# Patient Record
Sex: FEMALE | Race: WHITE | Employment: FULL TIME | URBAN - METROPOLITAN AREA
[De-identification: names, ages, dates, MRNs, and addresses within clinical notes are randomized per-mention and may not be internally consistent; named-entity substitution may affect disease eponyms.]

---

## 2018-09-17 ENCOUNTER — OFFICE VISIT (OUTPATIENT)
Dept: URGENT CARE | Age: 33
End: 2018-09-17

## 2018-09-17 VITALS
OXYGEN SATURATION: 99 % | SYSTOLIC BLOOD PRESSURE: 117 MMHG | DIASTOLIC BLOOD PRESSURE: 56 MMHG | WEIGHT: 115 LBS | BODY MASS INDEX: 20.38 KG/M2 | RESPIRATION RATE: 18 BRPM | HEART RATE: 67 BPM | HEIGHT: 63 IN | TEMPERATURE: 97.8 F

## 2018-09-17 DIAGNOSIS — L53.9 ACUTE ERYTHEMATOUS ERUPTION OF SKIN: Primary | ICD-10-CM

## 2018-09-17 RX ORDER — PREDNISONE 20 MG/1
20 TABLET ORAL 2 TIMES DAILY
Qty: 10 TAB | Refills: 0 | Status: SHIPPED | OUTPATIENT
Start: 2018-09-17 | End: 2018-09-22

## 2018-09-17 RX ORDER — MUPIROCIN 20 MG/G
OINTMENT TOPICAL DAILY
Qty: 22 G | Refills: 0 | Status: SHIPPED | OUTPATIENT
Start: 2018-09-17

## 2018-09-17 RX ORDER — NORETHINDRONE ACETATE AND ETHINYL ESTRADIOL 1; .02 MG/1; MG/1
TABLET ORAL
COMMUNITY

## 2018-09-17 NOTE — PATIENT INSTRUCTIONS

## 2018-09-17 NOTE — MR AVS SNAPSHOT
[address] [address] [place] [address] 
[phone] Patient: Ani Gonzalez MRN: [id number] Visit Information Date & Time Provider Department Dept. Phone Encounter #  
 9/17/2018  7:30 PM [facility] [facility] [phone] [id number] Upcoming Health Maintenance Date Due DTaP/Tdap/Td series (1 - Tdap) 9/21/2006 PAP AKA CERVICAL CYTOLOGY 9/21/2006 Influenza Age 5 to Adult 8/1/2018 Allergies as of 9/17/2018  Review Complete On: 9/17/2018 By: Rosaura Bobby RN No Known Allergies Current Immunizations  Never Reviewed No immunizations on file. Not reviewed this visit You Were Diagnosed With   
  
 Codes Comments Acute erythematous eruption of skin    -  Primary ICD-10-CM: L53.8 ICD-9-CM: 695.89 Rt side of neck Vitals BP Pulse Temp Resp Height(growth percentile) Weight(growth percentile) 117/56 67 97.8 °F (36.6 °C) 18 5' 3\" (1.6 m) 115 lb (52.2 kg) SpO2 BMI OB Status Smoking Status 99% 20.37 kg/m2 Having regular periods Never Smoker BMI and BSA Data Body Mass Index Body Surface Area  
 20.37 kg/m 2 1.52 m 2 Your Updated Medication List  
  
   
This list is accurate as of 9/17/18  7:57 PM.  Always use your most recent med list.  
  
  
  
  
 DYMISTA NA  
by Nasal route. LOESTRIN 1/20 (21) 1-20 mg-mcg Tab Generic drug:  norethindrone-ethinyl estradiol Take  by mouth.  
  
 mupirocin 2 % ointment Commonly known as:  [facility] Apply  to affected area daily. predniSONE 20 mg tablet Commonly known as:  Reino Singh Take 1 Tab by mouth two (2) times a day for 5 days. With food Prescriptions Printed Refills  
 mupirocin (BACTROBAN) 2 % ointment 0 Sig: Apply  to affected area daily. Class: Print  Route: Topical  
 predniSONE (DELTASONE) 20 mg tablet 0  
 Sig: Take 1 Tab by mouth two (2) times a day for 5 days. With food Class: Print Route: Oral  
  
Patient Instructions Rash: Care Instructions Your Care Instructions A rash is any irritation or inflammation of the skin. Rashes have many possible causes, including allergy, infection, illness, heat, and emotional stress. Follow-up care is a key part of your treatment and safety. Be sure to make and go to all appointments, and call your doctor if you are having problems. It's also a good idea to know your test results and keep a list of the medicines you take. How can you care for yourself at home? · Wash the area with water only. Soap can make dryness and itching worse. Pat dry. · Put cold, wet cloths on the rash to reduce itching. · Keep cool, and stay out of the sun. · Leave the rash open to the air as much of the time as possible. · Sometimes petroleum jelly (Vaseline) can help relieve the discomfort caused by a rash. A moisturizing lotion, such as Cetaphil, also may help. Calamine lotion may help for rashes caused by contact with something (such as a plant or soap) that irritated the skin. Use it 3 or 4 times a day. · If your doctor prescribed a cream, use it as directed. If your doctor prescribed medicine, take it exactly as directed. · If your rash itches so badly that it interferes with your normal activities, take an over-the-counter antihistamine, such as diphenhydramine (Benadryl) or loratadine (Claritin). Read and follow all instructions on the label. When should you call for help? Call your doctor now or seek immediate medical care if: 
  · You have signs of infection, such as: 
¨ Increased pain, swelling, warmth, or redness. ¨ Red streaks leading from the area. ¨ Pus draining from the area. ¨ A fever.  
  · You have joint pain along with the rash.  
 Watch closely for changes in your health, and be sure to contact your doctor if:   · Your rash is changing or getting worse. For example, call if you have pain along with the rash, the rash is spreading, or you have new blisters.  
  · You do not get better after 1 week. Where can you learn more? Go to http://malissa-saravanan.info/. Enter K032 in the search box to learn more about \"Rash: Care Instructions. \" Current as of: October 5, 2017 Content Version: 11.7 © 3198-3575 [employer]. Care instructions adapted under license by [employer] (which disclaims liability or warranty for this information). If you have questions about a medical condition or this instruction, always ask your healthcare professional. [address] any warranty or liability for your use of this information. Introducing [facility]! Harshal Conner introduces Qitio patient portal. Now you can access parts of your medical record, email your doctor's office, and request medication refills online. 1. In your internet browser, go to [email or web address] 2. Click on the First Time User? Click Here link in the Sign In box. You will see the New Member Sign Up page. 3. Enter your Qitio Access Code exactly as it appears below. You will not need to use this code after youve completed the sign-up process. If you do not sign up before the expiration date, you must request a new code. · Qitio Access Code: [email or web address] Expires: 12/16/2018  7:35 PM 
 
4. Enter the last four digits of your Social Security Number (xxxx) and Date of Birth (mm/dd/yyyy) as indicated and click Submit. You will be taken to the next sign-up page. 5. Create a Qitio ID. This will be your Qitio login ID and cannot be changed, so think of one that is secure and easy to remember. 6. Create a Qitio password. You can change your password at any time. 7. Enter your Password Reset Question and Answer.  This can be used at a later time if you forget your password. 8. Enter your e-mail address. You will receive e-mail notification when new information is available in [address] 9. Click Sign Up. You can now view and download portions of your medical record. 10. Click the Download Summary menu link to download a portable copy of your medical information. If you have questions, please visit the Frequently Asked Questions section of the SmartHabitat website. Remember, SmartHabitat is NOT to be used for urgent needs. For medical emergencies, dial 911. Now available from your iPhone and Android! Please provide this summary of care documentation to your next provider. Your primary care clinician is listed as Tami Farley. If you have any questions after today's visit, please call [phone].

## 2018-09-17 NOTE — PROGRESS NOTES
Patient is a 28 y.o. female presenting with skin problem. Skin Problem   This is a new problem. The current episode started more than 2 days ago. The problem has been gradually worsening. Associated symptoms comments: Small area of redness on rt side of neck- concern for shingles. Treatments tried: HC and bacitracin  The treatment provided no relief. Past Medical History:   Diagnosis Date    Gastroenteritis     2005        History reviewed. No pertinent surgical history. Family History   Problem Relation Age of Onset    Heart Disease Mother     Heart Disease Father         Social History     Social History    Marital status: SINGLE     Spouse name: N/A    Number of children: N/A    Years of education: N/A     Occupational History    Not on file. Social History Main Topics    Smoking status: Never Smoker    Smokeless tobacco: Never Used    Alcohol use Not on file    Drug use: Not on file    Sexual activity: Not on file     Other Topics Concern    Not on file     Social History Narrative                ALLERGIES: Review of patient's allergies indicates no known allergies. Review of Systems   All other systems reviewed and are negative. Vitals:    09/17/18 1938   BP: 117/56   Pulse: 67   Resp: 18   Temp: 97.8 °F (36.6 °C)   SpO2: 99%   Weight: 115 lb (52.2 kg)   Height: 5' 3\" (1.6 m)       Physical Exam   Skin: There is erythema (small papular lesion on rt side of neck ). Nursing note and vitals reviewed. MDM    Procedures      ICD-10-CM ICD-9-CM    1. Acute erythematous eruption of skin L53.8 695.89     Rt side of neck     Medications Ordered Today   Medications    mupirocin (BACTROBAN) 2 % ointment     Sig: Apply  to affected area daily. Dispense:  22 g     Refill:  0    predniSONE (DELTASONE) 20 mg tablet     Sig: Take 1 Tab by mouth two (2) times a day for 5 days. With food     Dispense:  10 Tab     Refill:  0     No results found for any visits on 09/17/18.   The patients condition was discussed with the patient and they understand. The patient is to follow up with primary care doctor. If signs and symptoms become worse the pt is to go to the ER. The patient is to take medications as prescribed.

## 2022-04-24 ENCOUNTER — OFFICE VISIT (OUTPATIENT)
Dept: URGENT CARE | Age: 37
End: 2022-04-24
Payer: COMMERCIAL

## 2022-04-24 VITALS
BODY MASS INDEX: 21.62 KG/M2 | RESPIRATION RATE: 16 BRPM | OXYGEN SATURATION: 100 % | WEIGHT: 122 LBS | SYSTOLIC BLOOD PRESSURE: 104 MMHG | HEIGHT: 63 IN | TEMPERATURE: 98 F | HEART RATE: 72 BPM | DIASTOLIC BLOOD PRESSURE: 70 MMHG

## 2022-04-24 DIAGNOSIS — M79.671 RIGHT FOOT PAIN: Primary | ICD-10-CM

## 2022-04-24 PROCEDURE — 99213 OFFICE O/P EST LOW 20 MIN: CPT | Performed by: INTERNAL MEDICINE

## 2022-04-24 RX ORDER — MELOXICAM 15 MG/1
15 TABLET ORAL DAILY
COMMUNITY
Start: 2021-09-17

## 2022-04-24 RX ORDER — METHYLPREDNISOLONE 4 MG/1
TABLET ORAL AS DIRECTED
COMMUNITY
Start: 2021-11-23

## 2022-04-24 RX ORDER — EPINEPHRINE 0.3 MG/.3ML
INJECTION SUBCUTANEOUS
COMMUNITY
Start: 2021-10-20

## 2022-04-24 RX ORDER — CETIRIZINE HYDROCHLORIDE 10 MG/1
CAPSULE, LIQUID FILLED ORAL DAILY
COMMUNITY

## 2022-04-24 RX ORDER — ESZOPICLONE 3 MG/1
3 TABLET, FILM COATED ORAL AT BEDTIME
COMMUNITY
Start: 2021-11-30

## 2022-04-24 RX ORDER — DEXTROAMPHETAMINE SACCHARATE, AMPHETAMINE ASPARTATE, DEXTROAMPHETAMINE SULFATE AND AMPHETAMINE SULFATE 2.5; 2.5; 2.5; 2.5 MG/1; MG/1; MG/1; MG/1
1 TABLET ORAL 3 TIMES DAILY
COMMUNITY
Start: 2022-03-23

## 2022-04-24 NOTE — PROGRESS NOTES
HISTORY OF PRESENT ILLNESS  Memory Tressa Todd is a 39 y.o. female. Patient presents with: Foot Pain: right foot, walked alot Friday without supportive shoes    No fall, misstep, or injury reported. However, does mention practicing yoga often with her foot being placed in different poses. Pain is primarily to plantar aspect but concerned for bruising to outer ankle noted this morning. Rested, iced, and took NSAID yesterday; but pain resumed upon waking BEFORE getting out of bed. Foot Pain  This is a new problem. The current episode started 2 days ago. The problem occurs constantly. The problem has not changed since onset. Pertinent negatives include no chest pain, no headaches and no shortness of breath. The symptoms are aggravated by walking. Nothing relieves the symptoms. She has tried a cold compress, acetaminophen and rest for the symptoms. The treatment provided mild relief. Patient Active Problem List   Diagnosis Code    Chest pain R07.9     Patient Active Problem List    Diagnosis Date Noted    Chest pain 01/03/2012     Current Outpatient Medications   Medication Sig Dispense Refill    methylPREDNISolone (MEDROL DOSEPACK) 4 mg tablet as directed.  dextroamphetamine-amphetamine (ADDERALL) 10 mg tablet Take 1 Tablet by mouth three (3) times daily.  eszopiclone (LUNESTA) 3 mg tablet Take 3 mg by mouth At bedtime.  Cetirizine (ZyrTEC) 10 mg cap daily.  azelastine/fluticasone (DYMISTA NA) by Nasal route.  mupirocin (BACTROBAN) 2 % ointment Apply  to affected area daily. 22 g 0    EPINEPHrine (EPIPEN) 0.3 mg/0.3 mL injection INJECT AS DIRECTED AS NEEDED FOR SEVERE ALLERGIC REACTION. CALL 911 AFTER USE      meloxicam (MOBIC) 15 mg tablet Take 15 mg by mouth daily.  norethindrone-ethinyl estradiol (LOESTRIN 1/20, 21,) 1-20 mg-mcg tab Take  by mouth.  (Patient not taking: Reported on 4/24/2022)       Allergies   Allergen Reactions    Sulfa (Sulfonamide Antibiotics) Other (comments)     GI upset - happen 30 years ago     Past Medical History:   Diagnosis Date    Gastroenteritis     2005     No past surgical history on file. Family History   Problem Relation Age of Onset    Heart Disease Mother     Heart Disease Father      Social History     Tobacco Use    Smoking status: Never Smoker    Smokeless tobacco: Never Used   Substance Use Topics    Alcohol use: Not on file          Review of Systems   Constitutional: Negative for chills, fever and malaise/fatigue. HENT: Negative for congestion and sore throat. Respiratory: Negative for cough and shortness of breath. Cardiovascular: Negative for chest pain. Gastrointestinal: Negative for diarrhea, nausea and vomiting. Musculoskeletal: Negative for falls and myalgias. Neurological: Negative for headaches. All other systems reviewed and are negative. Physical Exam  Vitals and nursing note reviewed. Constitutional:       General: She is not in acute distress. Appearance: She is well-developed. She is not diaphoretic. HENT:      Head: Normocephalic and atraumatic. Right Ear: External ear normal.      Left Ear: External ear normal.   Cardiovascular:      Rate and Rhythm: Normal rate. Pulmonary:      Effort: Pulmonary effort is normal. No respiratory distress. Abdominal:      General: There is no distension. Musculoskeletal:      Right foot: Normal range of motion. No deformity, bunion or foot drop. Feet:    Feet:      Right foot:      Skin integrity: Skin integrity normal.      Toenail Condition: Right toenails are normal.   Neurological:      Mental Status: She is alert and oriented to person, place, and time. Psychiatric:         Behavior: Behavior normal.         Judgment: Judgment normal.         ASSESSMENT and PLAN    ICD-10-CM ICD-9-CM    1.  Right foot pain  M79.671 729.5 XR FOOT RT MIN 3 V

## 2022-04-24 NOTE — PATIENT INSTRUCTIONS
Follow Up with PCP/ortho in 1-2 WEEKS if no improvement/routine care. Call to be seen sooner or return Here/ER, if no improvement with treatments advised/prescribed or symptoms/pain worsen (develop fever, pain worsens significantly, or develop NEW symptoms). Plantar Fasciitis: Care Instructions  Overview     Plantar fasciitis is pain and inflammation of the plantar fascia, the tissue at the bottom of your foot that connects the heel bone to the toes. The plantar fascia also supports the arch. If you strain the plantar fascia, it can develop small tears and cause heel pain when you stand or walk. Plantar fasciitis can be caused by running or other sports. It also may occur in people who are overweight or who have high arches or flat feet. You may get plantar fasciitis if you walk or stand for long periods, or have a tight Achilles tendon or calf muscles. You can improve your foot pain with rest and other care at home. It might take a few weeks to a few months for your foot to heal completely. Follow-up care is a key part of your treatment and safety. Be sure to make and go to all appointments, and call your doctor if you are having problems. It's also a good idea to know your test results and keep a list of the medicines you take. How can you care for yourself at home? · Rest your feet often. Reduce your activity to a level that lets you avoid pain. If possible, do not run or walk on hard surfaces. · Take pain medicines exactly as directed. ? If the doctor gave you a prescription medicine for pain, take it as prescribed. ? If you are not taking a prescription pain medicine, take an over-the-counter anti-inflammatory medicine for pain and swelling, such as ibuprofen (Advil, Motrin) or naproxen (Aleve). Read and follow all instructions on the label. · Use ice massage to help with pain and swelling. You can use an ice cube or an ice cup several times a day.  To make an ice cup, fill a paper cup with water and freeze it. Cut off the top of the cup until a half-inch of ice shows. Hold onto the remaining paper to use the cup. Rub the ice in small circles over the area for 5 to 7 minutes. · Contrast baths, which alternate hot and cold water, can also help reduce swelling. But because heat alone may make pain and swelling worse, end a contrast bath with a soak in cold water. · Wear a night splint if your doctor suggests it. A night splint holds your foot with the toes pointed up and the foot and ankle at a 90-degree angle. This position gives the bottom of your foot a constant, gentle stretch. · Do simple exercises such as calf stretches and towel stretches 2 to 3 times each day, especially when you first get up in the morning. These can help the plantar fascia become more flexible. They also make the muscles that support your arch stronger. Hold these stretches for 15 to 30 seconds per stretch. Repeat 2 to 4 times. ? Stand about 1 foot from a wall. Place the palms of both hands against the wall at chest level. Lean forward against the wall, keeping one leg with the knee straight and heel on the ground while bending the knee of the other leg.  ? Sit down on the floor or a mat with your feet stretched in front of you. Roll up a towel lengthwise, and loop it over the ball of your foot. Holding the towel at both ends, gently pull the towel toward you to stretch your foot. · Wear shoes with good arch support. Athletic shoes or shoes with a well-cushioned sole are good choices. · Replace athletic shoes regularly. · Try heel cups or shoe inserts (orthotics) to help cushion your heel. You can buy these at many shoe stores. · Put on your shoes as soon as you get out of bed. Going barefoot or wearing slippers may make your pain worse. · Reach and stay at a good weight for your height. This puts less strain on your feet. When should you call for help?    Call your doctor now or seek immediate medical care if:    · You have heel pain with fever, redness, or warmth in your heel.     · You cannot put weight on the sore foot. Watch closely for changes in your health, and be sure to contact your doctor if:    · You have numbness or tingling in your heel.     · Your heel pain lasts more than 2 weeks. Where can you learn more? Go to http://www.gray.com/  Enter X351 in the search box to learn more about \"Plantar Fasciitis: Care Instructions. \"  Current as of: July 1, 2021               Content Version: 13.2  © 2803-5335 [employer]. Care instructions adapted under license by User [employer]play (which disclaims liability or warranty for this information). If you have questions about a medical condition or this instruction, always ask your healthcare professional. [address] any warranty or liability for your use of this information. Plantar Fasciitis: Exercises  Introduction  Here are some examples of exercises for you to try. The exercises may be suggested for a condition or for rehabilitation. Start each exercise slowly. Ease off the exercises if you start to have pain. You will be told when to start these exercises and which ones will work best for you. How to do the exercises  Towel stretch    1. Sit with your legs extended and knees straight. 2. Place a towel around your foot just under the toes. 3. Hold each end of the towel in each hand, with your hands above your knees. 4. Pull back with the towel so that your foot stretches toward you. 5. Hold the position for at least 15 to 30 seconds. 6. Repeat 2 to 4 times a session, up to 5 sessions a day. Calf stretch    This exercise stretches the muscles at the back of the lower leg (the calf) and the Achilles tendon. Do this exercise 3 or 4 times a day, 5 days a week. 1. Stand facing a wall with your hands on the wall at about eye level.  Put the leg you want to stretch about a step behind your other leg. 2. Keeping your back heel on the floor, bend your front knee until you feel a stretch in the back leg. 3. Hold the stretch for 15 to 30 seconds. Repeat 2 to 4 times. Plantar fascia and calf stretch    Stretching the plantar fascia and calf muscles can increase flexibility and decrease heel pain. You can do this exercise several times each day and before and after activity. 1. Stand on a step as shown above. Be sure to hold on to the banister. 2. Slowly let your heels down over the edge of the step as you relax your calf muscles. You should feel a gentle stretch across the bottom of your foot and up the back of your leg to your knee. 3. Hold the stretch about 15 to 30 seconds, and then tighten your calf muscle a little to bring your heel back up to the level of the step. Repeat 2 to 4 times. Towel curls    Make this exercise more challenging by placing a weighted object, such as a soup can, on the other end of the towel. 1. While sitting, place your foot on a towel on the floor and scrunch the towel toward you with your toes. 2. Then, also using your toes, push the towel away from you. [place] pickups    1. Put marbles on the floor next to a cup.  2. Using your toes, try to lift the marbles up from the floor and put them in the cup. Follow-up care is a key part of your treatment and safety. Be sure to make and go to all appointments, and call your doctor if you are having problems. It's also a good idea to know your test results and keep a list of the medicines you take. Where can you learn more? Go to http://www.gray.com/  Enter [id number] in the search box to learn more about \"Plantar Fasciitis: Exercises. \"  Current as of: July 1, 2021               Content Version: 13.2  © 2006-2022 [employer]wise[employer]. Care instructions adapted under license by [employer] (which disclaims liability or warranty for this information).  If you have questions about a medical condition or this instruction, always ask your healthcare professional. [place] [address] any warranty or liability for your use of this information.

## 2022-11-29 ENCOUNTER — OFFICE VISIT (OUTPATIENT)
Dept: URGENT CARE | Age: 37
End: 2022-11-29
Payer: COMMERCIAL

## 2022-11-29 VITALS
WEIGHT: 121 LBS | OXYGEN SATURATION: 100 % | HEART RATE: 67 BPM | DIASTOLIC BLOOD PRESSURE: 65 MMHG | BODY MASS INDEX: 21.43 KG/M2 | TEMPERATURE: 98.3 F | SYSTOLIC BLOOD PRESSURE: 103 MMHG | RESPIRATION RATE: 15 BRPM

## 2022-11-29 DIAGNOSIS — J06.9 UPPER RESPIRATORY TRACT INFECTION, UNSPECIFIED TYPE: Primary | ICD-10-CM

## 2022-11-29 PROCEDURE — 99213 OFFICE O/P EST LOW 20 MIN: CPT | Performed by: NURSE PRACTITIONER

## 2022-11-29 RX ORDER — AZITHROMYCIN 250 MG/1
TABLET, FILM COATED ORAL
Qty: 6 TABLET | Refills: 0 | Status: SHIPPED | OUTPATIENT
Start: 2022-11-29

## 2022-11-29 RX ORDER — PREDNISONE 20 MG/1
20 TABLET ORAL 2 TIMES DAILY
Qty: 10 TABLET | Refills: 0 | Status: SHIPPED | OUTPATIENT
Start: 2022-11-29 | End: 2022-12-04

## 2022-11-29 NOTE — PROGRESS NOTES
Cough  The history is provided by the Patient. This is a new problem. Episode onset: 5 days ago. The problem occurs every few minutes. The problem has been gradually worsening. The cough is Productive of purulent sputum. There has been no fever. Pertinent negatives include no chest pain, no chills, no sore throat, no shortness of breath, no wheezing, no nausea and no vomiting. Associated symptoms comments: Nasal congestion. Treatments tried: mucinex, tessalon perles, promethazine dm cough syrup. The treatment provided no relief. She is not a smoker. Home covid test negative. Past Medical History:   Diagnosis Date    Gastroenteritis     2005        History reviewed. No pertinent surgical history. Family History   Problem Relation Age of Onset    Heart Disease Mother     Heart Disease Father         Social History     Socioeconomic History    Marital status: SINGLE     Spouse name: Not on file    Number of children: Not on file    Years of education: Not on file    Highest education level: Not on file   Occupational History    Not on file   Tobacco Use    Smoking status: Never    Smokeless tobacco: Never   Substance and Sexual Activity    Alcohol use: Not on file    Drug use: Not on file    Sexual activity: Not on file   Other Topics Concern    Not on file   Social History Narrative    Not on file     Social Determinants of Health     Financial Resource Strain: Not on file   Food Insecurity: Not on file   Transportation Needs: Not on file   Physical Activity: Not on file   Stress: Not on file   Social Connections: Not on file   Intimate Partner Violence: Not on file   Housing Stability: Not on file                ALLERGIES: Sulfa (sulfonamide antibiotics)    Review of Systems   Constitutional:  Negative for chills and fever. HENT:  Positive for congestion. Negative for sore throat. Respiratory:  Positive for cough. Negative for chest tightness, shortness of breath and wheezing.     Cardiovascular: Negative for chest pain. Gastrointestinal:  Negative for abdominal pain, diarrhea, nausea and vomiting. Vitals:    11/29/22 1545   BP: 103/65   Pulse: 67   Resp: 15   Temp: 98.3 °F (36.8 °C)   SpO2: 100%   Weight: 121 lb (54.9 kg)       Physical Exam  Constitutional:       General: She is not in acute distress. Appearance: Normal appearance. She is well-developed. She is not ill-appearing or toxic-appearing. HENT:      Head: Normocephalic and atraumatic. Right Ear: Tympanic membrane, ear canal and external ear normal.      Left Ear: Tympanic membrane, ear canal and external ear normal.      Nose: Nose normal.      Mouth/Throat:      Mouth: Mucous membranes are moist.      Pharynx: Oropharynx is clear. Eyes:      Extraocular Movements: Extraocular movements intact. Conjunctiva/sclera: Conjunctivae normal.      Pupils: Pupils are equal, round, and reactive to light. Cardiovascular:      Rate and Rhythm: Normal rate and regular rhythm. Heart sounds: Normal heart sounds. Pulmonary:      Effort: Pulmonary effort is normal.      Breath sounds: Normal breath sounds. Musculoskeletal:      Cervical back: Normal range of motion and neck supple. Lymphadenopathy:      Cervical: No cervical adenopathy. Skin:     General: Skin is warm and dry. Neurological:      General: No focal deficit present. Mental Status: She is alert and oriented to person, place, and time. ICD-10-CM ICD-9-CM   1. Upper respiratory tract infection, unspecified type  J06.9 465.9       Orders Placed This Encounter    azithromycin (ZITHROMAX) 250 mg tablet     Sig: Take two tablets today then one tablet daily on Days 2-5     Dispense:  6 Tablet     Refill:  0    predniSONE (DELTASONE) 20 mg tablet     Sig: Take 1 Tablet by mouth two (2) times a day for 5 days. Dispense:  10 Tablet     Refill:  0        The patient is to follow up with PCP. If signs and symptoms become worse the pt is to go to the ER. Tavia Domingo, NP       MDM    Procedures

## 2023-05-26 RX ORDER — AZITHROMYCIN 250 MG/1
TABLET, FILM COATED ORAL
COMMUNITY
Start: 2022-11-29

## 2023-05-26 RX ORDER — DEXTROAMPHETAMINE SACCHARATE, AMPHETAMINE ASPARTATE, DEXTROAMPHETAMINE SULFATE AND AMPHETAMINE SULFATE 2.5; 2.5; 2.5; 2.5 MG/1; MG/1; MG/1; MG/1
1 TABLET ORAL 3 TIMES DAILY
COMMUNITY
Start: 2022-03-23

## 2023-05-26 RX ORDER — EPINEPHRINE 0.3 MG/.3ML
INJECTION SUBCUTANEOUS
COMMUNITY
Start: 2021-10-20

## 2023-05-26 RX ORDER — ESZOPICLONE 3 MG/1
3 TABLET, FILM COATED ORAL NIGHTLY
COMMUNITY
Start: 2021-11-30

## 2023-05-26 RX ORDER — NORETHINDRONE ACETATE AND ETHINYL ESTRADIOL 1; .02 MG/1; MG/1
TABLET ORAL
COMMUNITY

## 2023-05-26 RX ORDER — MELOXICAM 15 MG/1
15 TABLET ORAL DAILY
COMMUNITY
Start: 2021-09-17

## 2023-05-26 RX ORDER — CETIRIZINE HYDROCHLORIDE 10 MG/1
CAPSULE, LIQUID FILLED ORAL DAILY
COMMUNITY